# Patient Record
Sex: MALE
[De-identification: names, ages, dates, MRNs, and addresses within clinical notes are randomized per-mention and may not be internally consistent; named-entity substitution may affect disease eponyms.]

---

## 2019-11-04 NOTE — PRE-PROCEDURE NOTE/ATTESTATION
Pre-Procedure Note/Attestation


Complete Prior to Procedure


Planned Procedure:  not applicable


Procedure Narrative:


1. Septoplasty


2. Submucous resection right inferior turbinate


3. Submucous resection left inferior turbinate





Indications for Procedure


Pre-Operative Diagnosis:


1. Nasal septal deviation


2. Hypertrophied right inferior turbinate


3. Hypertrophied left inferior turbinate.





Attestation


I attest that I discussed the nature of the procedure; its benefits; risks and 

complications; and alternatives (and the risks and benefits of such alternatives

), prior to the procedure, with the patient (or the patient's legal 

representative).





I attest that, if there was a reasonable possibility of needing a blood 

transfusion, the patient (or the patient's legal representative) was given the 

[place] [employer] standardized written summary, pursuant 

to the [employer] [place] Blood Safety Act ([place] Health and Safety Code # 1645, as 

amended).





I attest that I re-evaluated the patient just prior to the surgery and that 

there has been no change in the patient's H&P.











Grady Medel MD Nov 4, 2019 17:51

## 2019-11-04 NOTE — BRIEF OPERATIVE NOTE
Immediate Post Operative Note


Operative Note


Chief Complaint:  Nasal airway obstruction


Pre-op Diagnosis:


1. Nasal septal deviation


2. Hypertrophied right inferior turbinate


3. Hypertrophied left inferior turbinate.


Procedure:


1. Septoplasty


2. Submucous resection right inferior turbinate


3. Submucous resection left inferior turbinate


Post-op Diagnosis:  same as pre-op


Surgeon:  Grady Medel


Assistant:  none


Additional Surgeons:  none


Anesthesiologist:  George


Anesthesia:  general


Specimen:  none


Complications:  none


Condition:  stable


Fluids:  D5LR


Estimated Blood Loss:  volume - 20 cc


Drains:  none


Packing:  NasoSino gel


Implant(s) used?:  No











Grady Medel MD Nov 4, 2019 17:52

## 2019-11-04 NOTE — HISTORY AND PHYSICAL
History & Physical (DB)


History & Physical


History & Physical





Chief Complaint: nasal airway obstruction which did not respond to nasal 

steroids or antihistamines. 





Reason for Hospitalization:  outpt surgery Septo/turbs





History obtained from: Chart and Patient








HPI:  33 yo male











Past Medical History:unremarkable   








Social History:Single, no children








Diagnosis: Nasal septal deviation and bilateral hypertrophied inferior 

turbinates. 








Past Surgical History: Appy








Occupational History:





Smoking status:none





Smokeless tobacco:none





Alcohol use:none


Drug use:none








Family History:unremarkable





Allergies:NKDA





Medications:none





Review of Symptoms:





General ROS: no weight loss or fever


Psychological ROS: no depression or mood changes, no memory loss


Ophthalmic ROS: no visual changes or eye irritation


ENT ROS: NASAL SEPTAL DEVIATION AND HYPERTROPHIED INFERIOR TURBINATES + nasal 

congestion, hearing loss, dizziness


Allergy and Immunology ROS: no allergic symptoms or urticaria


Hematological and Lymphatic ROS: no swollen glands, unusual bleeding or bruising


Endocrine ROS: no polyuria, polydipsia, weight changes, temperature intolerance


Respiratory ROS: no cough, shortness of breath, or wheezing


Cardiovascular ROS: no chest pain or dyspnea on exertion


Gastrointestinal ROS: no abdominal pain, change in bowel habits, or black or 

bloody stools


Musculoskeletal ROS: no myalgias or arthralgias


Neurological ROS: no TIA or stroke symptoms


Dermatological ROS: no new or changing skin lesions, rashes or pruritis














Physical Exam





Vitals: 





Intake/Output Summary (Last 24 hours) 





General appearance:  alert, cooperative, no distress, appears stated age


Head:  Normocephalic, without obvious abnormality, atraumatic


NOSE: NASAL SEPTAL DEVIATION AND HYPERTROPHIED INFERIOR TURBINATES. 


Eyes:  conjunctivae/corneas clear. PERRL, EOM's intact. 


Throat:  Lips, mucosa, and tongue normal. Teeth and gums normal


Neck:  supple, symmetrical, trachea midline, no adenopathy, thyroid: not 

enlarged, symmetric, no tenderness/mass/nodules, no carotid bruit and no JVD


Lungs:  clear to auscultation bilaterally


Heart:  regular rate and rhythm, S1, S2 normal, no murmur, click, rub or gallop


Abdomen:  soft, non-tender. Bowel sounds normal. No masses,  no organomegaly


Extremities:  extremities normal, atraumatic, no cyanosis or edema


Pulses:  2+ and symmetric


Skin:  Skin color, texture, turgor normal. No rashes or lesions


Neurologic:  Grossly normal








Laboratories:NONE REQUIRED-HEALTHY UNDER 45 YEARS OLD.





Imaging and ancillary data: NONE





Assessment/Problem List:   


NASAL SEPTAL DEVIATION


HYPERTROPHIED BILATERAL INFERIOR TURBINATES








Plan:





1. Septoplasty


2. Submucous resection right inferior turbinate


3. Submucous resection left inferior turbinate





   


DVT Prophylaxis: scd


Code status: full


Hospital Classification declaration: Based on this initial evaluation, and 

depending on the patient's clinical course, I anticipate that this patient will 

require hospitalization for 2-3 days. 


Disposition: Once the patient is stable to leave the hospital, I anticipate the 

patient will likely be discharged to the following environment





I spent 70 minutes on this patient's case, and  minutes was dedicated to 

counseling and/or care coordination. Case was discussed with 





Time of note may not reflect time of encounter.











Grady Medel MD Nov 4, 2019 18:11

## 2019-11-04 NOTE — DISCHARGE INSTRUCTIONS
Discharge Instructions


Discharge Instructions


Follow up with:  next week in Dr. Medel's office-pt already has appt.


Resume Normal Activity?:  No


Activity:  light activity


Pneumonia Vaccine:  pt refused vaccine


Influenza Vaccine (Oct to Mar):  pt refused vaccine


Follow Up Orders


Pt has printed post op instructions which were reviewed and given to him at his 

pre p visit in my office last week along with his post op meds Rx.


Return to Work/School on:  Nov 18, 2019


Special Instructions


ice to face x 48 hours





For Surgical Patients


Dressing Care:  may change





For Congestive Heart Failure


Reminder


Report to your physician any weight gain of 5 pounds or more in one week.











Grady Medel MD Nov 4, 2019 17:57

## 2019-11-05 ENCOUNTER — HOSPITAL ENCOUNTER (OUTPATIENT)
Dept: HOSPITAL 72 - SUR | Age: 34
Discharge: HOME | End: 2019-11-05
Payer: COMMERCIAL

## 2019-11-05 VITALS — DIASTOLIC BLOOD PRESSURE: 86 MMHG | SYSTOLIC BLOOD PRESSURE: 136 MMHG

## 2019-11-05 VITALS — BODY MASS INDEX: 24.08 KG/M2 | WEIGHT: 172 LBS | HEIGHT: 71 IN

## 2019-11-05 VITALS — DIASTOLIC BLOOD PRESSURE: 84 MMHG | SYSTOLIC BLOOD PRESSURE: 134 MMHG

## 2019-11-05 VITALS — DIASTOLIC BLOOD PRESSURE: 84 MMHG | SYSTOLIC BLOOD PRESSURE: 129 MMHG

## 2019-11-05 VITALS — SYSTOLIC BLOOD PRESSURE: 135 MMHG | DIASTOLIC BLOOD PRESSURE: 84 MMHG

## 2019-11-05 VITALS — DIASTOLIC BLOOD PRESSURE: 76 MMHG | SYSTOLIC BLOOD PRESSURE: 117 MMHG

## 2019-11-05 VITALS — SYSTOLIC BLOOD PRESSURE: 138 MMHG | DIASTOLIC BLOOD PRESSURE: 89 MMHG

## 2019-11-05 VITALS — DIASTOLIC BLOOD PRESSURE: 85 MMHG | SYSTOLIC BLOOD PRESSURE: 135 MMHG

## 2019-11-05 VITALS — DIASTOLIC BLOOD PRESSURE: 82 MMHG | SYSTOLIC BLOOD PRESSURE: 133 MMHG

## 2019-11-05 VITALS — SYSTOLIC BLOOD PRESSURE: 131 MMHG | DIASTOLIC BLOOD PRESSURE: 81 MMHG

## 2019-11-05 DIAGNOSIS — Z90.89: ICD-10-CM

## 2019-11-05 DIAGNOSIS — J34.3: ICD-10-CM

## 2019-11-05 DIAGNOSIS — J34.2: Primary | ICD-10-CM

## 2019-11-05 PROCEDURE — 30520 REPAIR OF NASAL SEPTUM: CPT

## 2019-11-05 PROCEDURE — 94150 VITAL CAPACITY TEST: CPT

## 2019-11-05 PROCEDURE — 30140 RESECT INFERIOR TURBINATE: CPT

## 2019-11-05 PROCEDURE — 94003 VENT MGMT INPAT SUBQ DAY: CPT

## 2019-11-05 NOTE — OPERATIVE NOTE - DICTATED
DATE OF OPERATION:  11/05/2019

SURGEON:  Grady Medel M.D.



ASSISTANT:  None.



ANESTHESIOLOGIST:  George.



ANESTHESIA:  LMA general, 4 mL of 4% topical cocaine on four nasal pledgets

accounted for at the end of the case as well as 10 mL of 50:50 mixture of

lidocaine 1% with 100,000 epinephrine and Sensorcaine 0.5% with 1:200,000

epinephrine.



INDICATION FOR SURGERY:  The patient with a nasal septal deviation and

hypertrophied bilateral inferior turbinates, who has failed nasal steroids

and antihistamines.



PREOPERATIVE DIAGNOSIS:  The patient with a nasal septal deviation and

hypertrophied bilateral inferior turbinates, who has failed nasal steroids

and antihistamines.



POSTOPERATIVE DIAGNOSIS:  The patient with a nasal septal deviation and

hypertrophied bilateral inferior turbinates, who has failed nasal steroids

and antihistamines.



FINDINGS:  The patient with a nasal septal deviation and hypertrophied

bilateral inferior turbinates, who has failed nasal steroids and

antihistamines.



PROCEDURE:

1. Septoplasty.

2. Submucous resection, right inferior turbinate.

3. Submucous resection, left inferior turbinate.



TECHNIQUE:  The patient prepped and draped in usual manner via LMA general

anesthesia.  A time-out was performed and all agreed as to the procedure

to be done and equipment required.  I then proceeded to inject with the

aforementioned lidocaine, Marcaine, and epinephrine mixture and placed the

four nasal pledgets with the 4% cocaine 4 mL.



Initially, I addressed the left inferior turbinate.  Incision anterior

inferior with #15 blade.  I then passed a radiofrequency wand after

coating with saline gel x2, setting of 6 for 10 seconds.  I then

outfractured with a Boies elevator.



I turned my attention to the right inferior turbinate.  Also made a

small incision anterior inferior aspect.  Two passes with a radiofrequency

wand after coating with saline gel, setting of 6, 10 seconds each.

Outfractured with a Boies elevator.



Kevin incision was made on the right-hand side with a 15 blade.  I was

able to elevate the perichondrium and periosteum on both sides without

violating the left mucosa.  I then proceeded to use a small angled

scissors to remove the superior of the quadrangular cartilage leaving at

least a centimeter superiorly and a centimeter inferiorly.  I then used a

gouge osteotome to remove the vomer.  Utilizing a large Alejandra forceps,

this piece of cartilage was removed.  I then used a Ronadl to remove

the deviated parts of the bony aspect of the septal cartilage.  I then

placed a 4-0 plain suture to close the Kevin incision.



Sinonasal gel one syringe was divided between the two nares about 65% to

70% on the right side where most of the surgery was done and the remainder

on the left side.  Mustache dressing was placed.



The patient is awake and alert and extubated prior to transfer to the

recovery room where he also was stable 10 minutes later.



ESTIMATED BLOOD LOSS:  20 mL.



COMPLICATIONS:  None.



DRAINS:  None.



SPONGE AND NEEDLE COUNT:  Correct.









  ______________________________________________

  Grady Medel M.D.





DR:  MYKE

D:  11/05/2019 08:55

T:  11/05/2019 14:41

JOB#:  [id number]

CC:

## 2019-11-05 NOTE — 48 HOUR POST ANESTHESIA EVAL
Post Anesthesia Evaluation


Procedure:  Septoplasty


Date of Evaluation:  Nov 5, 2019


Time of Evaluation:  09:33


Blood Pressure Systolic:  135


0:  65


Pulse Rate:  70


Respiratory Rate:  14


O2 Sat by Pulse Oximetry:  98


Airway:  patent


Nausea:  No


Vomiting:  No


Hydration Status:  adequate


Cardiopulmonary Status:


stable


Mental Status/LOC:  patient returned to baseline


Follow-up Care/Observations:


na


Post-Anesthesia Complications:


none


Follow-up care needed:  N/A











Mell Little CRNA Nov 5, 2019 09:34

## 2019-11-05 NOTE — ANETHESIA PREOPERATIVE EVAL
Anesthesia Pre-op PMH/ROS


General


Date of Evaluation:  Nov 5, 2019


Time of Evaluation:  07:25


Anesthesiologist:  jeremy


ASA Score:  ASA 1


Mallampati Score


Class I : Soft palate, uvula, fauces, pillars visible


Class II: Soft palate, uvula, fauces visible


Class III: Soft palate, base of uvula visible


Class IV: Only hard plate visible


Mallampati Classification:  Class II


Surgeon:  chet


Diagnosis:  dev septum


Surgical Procedure:  septoplasty


Anesthesia History:  none


Family History:  no anesthesia problems


Allergies:  


Coded Allergies:  


     No Known Allergies (Unverified , 11/4/19)


Medications:  see eMAR


Patient NPO?:  Yes


NPO Date:  Nov 5, 2019


NPO Time:  00:01





Past Medical History


Cardiovascular:  Denies: HTN, CAD, MI, valve dz, arrhythmia, other


Pulmonary:  Denies: asthma, COPD, ROXANE, other


Gastrointestinal/Genitourinary:  Denies: GERD, CRI, ESRD, other


Neurologic/Psychiatric:  Denies: dementia, CVA, depression/anxiety, TIA, other


Endocrine:  Denies: DM, hypothyroidism, steroids, other


HEENT:  Denies: cataract (L), cataract (R), glaucoma, [place] (L), [place] (R), other


Hematology/Immune:  Denies: anemia, DVT, bleeding disorder, other


Musculoskeletal/Integumentary:  Denies: OA, RA, DJD, DDD, edema, other


PSxH Narrative:


lap appy





Anesthesia Pre-op Phys. Exam


Physician Exam





Last Vital Signs








  Date Time  Temp Pulse Resp B/P (MAP) Pulse Ox O2 Delivery O2 Flow Rate FiO2


 


11/5/19 06:36      Room Air  


 


11/5/19 06:32 98.3 75 18 131/81 99   








Constitutional:  NAD


Neurologic:  CN 2-12 intact


Cardiovascular:  RRR


Respiratory:  CTA


Gastrointestinal:  S/NT/ND





Airway Exam


Mallampati Classification


2


Mallampati Score:  Class II


MO:  full


Neck:  normal


TMD:  2fb


ROM:  full


Dentures:  no upper, no lower





Anesthesia Pre-op A/P


Studies


Pre-op Studies:  EKG - sr





Risk Assessment & Plan


Plan:


general


Status Change Before Surgery:  No





Pre-Antibiotics


Drug:  ancef


Given Within 1 Hr of Incision:  Yes


Time Given:  07:45











Mell Little CRNA Nov 5, 2019 08:02

## 2019-11-05 NOTE — IMMEDIATE POST-OP EVALUATION
Immediate Post-Op Evalulation


Immediate Post-Op Evalulation


Procedure:  Septoplasty


Date of Evaluation:  Nov 5, 2019


Time of Evaluation:  08:27


IV Fluids:  500


Blood Products:  0


Estimated Blood Loss:  200


Blood Pressure Systolic:  125


Blood Pressure Diastolic:  84


Pulse Rate:  100


Respiratory Rate:  14


O2 Sat by Pulse Oximetry:  100


Temperature (Fahrenheit):  97.4


Nausea:  No


Vomiting:  No


Complications


none


Patient Status:  awake, reacts, patent


Hydration Status:  adequate


Drug:  ancef


Given Within 1 Hr of Incision:  Yes


Time Given:  12:25











Mell Little CRNA Nov 5, 2019 08:28